# Patient Record
Sex: FEMALE | Race: WHITE | Employment: FULL TIME | URBAN - METROPOLITAN AREA
[De-identification: names, ages, dates, MRNs, and addresses within clinical notes are randomized per-mention and may not be internally consistent; named-entity substitution may affect disease eponyms.]

---

## 2017-02-03 ENCOUNTER — TELEPHONE (OUTPATIENT)
Dept: FAMILY MEDICINE CLINIC | Facility: CLINIC | Age: 32
End: 2017-02-03

## 2017-02-03 RX ORDER — CALCIUM CITRATE/VITAMIN D3 200MG-6.25
1 TABLET ORAL DAILY
COMMUNITY
Start: 2016-02-16

## 2017-02-03 RX ORDER — CLONAZEPAM 0.5 MG/1
TABLET ORAL
COMMUNITY
Start: 2016-02-17 | End: 2017-03-10

## 2017-03-11 RX ORDER — CLONAZEPAM 0.5 MG/1
TABLET ORAL
Qty: 30 TABLET | Refills: 5 | Status: SHIPPED
Start: 2017-03-11 | End: 2017-03-14

## 2017-03-14 RX ORDER — CLONAZEPAM 0.5 MG/1
TABLET ORAL
Qty: 30 TABLET | Refills: 5 | Status: SHIPPED
Start: 2017-03-14 | End: 2017-09-11

## 2017-03-15 ENCOUNTER — TELEPHONE (OUTPATIENT)
Dept: FAMILY MEDICINE CLINIC | Facility: CLINIC | Age: 32
End: 2017-03-15

## 2017-03-15 NOTE — TELEPHONE ENCOUNTER
Patient informed of Dr Haley Iniguez documentation below.   Expressed understanding,  Angelica Armendariz, 03/15/2017, 6:40 PM

## 2017-03-15 NOTE — TELEPHONE ENCOUNTER
Patient asking if ok to use tanning murdock. Has heard it could help with depression. Please advise.   Syeda Conklin, 03/15/2017, 3:57 PM

## 2017-04-14 ENCOUNTER — TELEPHONE (OUTPATIENT)
Dept: FAMILY MEDICINE CLINIC | Facility: CLINIC | Age: 32
End: 2017-04-14

## 2017-04-14 NOTE — TELEPHONE ENCOUNTER
Patient states she is on sertraline. Has been on the medication for 9 months. States \"winter has been hard\" and wants to increase dose on sertraline. States she needs more of a push. Patient has upcoming appt this month with Dr. Ilana Loza.  Has been watchi

## 2017-04-17 NOTE — TELEPHONE ENCOUNTER
Patient called again today about the following below. Informed patient that Dr Shante Seo is out of the office until Tuesday 4/18 and patient is ok to wait until then.    Future Appointments  Date Time Provider Chen Rebollar   4/24/2017 10:00 AM Shante Seo,

## 2017-04-18 NOTE — TELEPHONE ENCOUNTER
Patient informed of Dr Slava Scott instructions below. Expressed understanding.   Gerard Thurston, 04/18/2017, 11:06 AM

## 2017-04-24 PROBLEM — R53.83 FATIGUE: Status: ACTIVE | Noted: 2017-04-24

## 2017-04-24 NOTE — PROGRESS NOTES
[address]  PROGRESS NOTE  Chief Complaint:   Patient presents with: Anxiety  Follow - Up      HPI:   This is a 27year old female coming in for follow-up on her anxiety and depression.   She has been taking clonazepam in the evening and Denies eye pain, visual loss, blurred vision, double vision or yellow sclerae. Ears, Nose, Throat:  Denies hearing loss, sneezing, congestion, runny nose or sore throat. INTEGUMENTARY:  Denies rashes, itching, skin lesion, or excessive skin dryness.   CARD rate and rhythm, no murmurs, rubs or gallops. LUNGS: Clear to auscultation bilterally, no rales/rhonchi/wheezing. ASSESSMENT AND PLAN:   1.  Moderate episode of recurrent major depressive disorder ([facility] [address]  Her depression is relatively well-controlled with

## 2017-07-07 ENCOUNTER — TELEPHONE (OUTPATIENT)
Dept: FAMILY MEDICINE CLINIC | Facility: CLINIC | Age: 32
End: 2017-07-07

## 2017-07-07 NOTE — TELEPHONE ENCOUNTER
Patient going on trip to [facility] end of July. States She will be arriving in [facility] [place] in AM of their time. Time change is 7-8hrs difference.     Will take Clonazepam on flight over for night time dose and then  Start Sertraline in AM when She gets there for

## 2017-09-11 ENCOUNTER — TELEPHONE (OUTPATIENT)
Dept: FAMILY MEDICINE CLINIC | Facility: CLINIC | Age: 32
End: 2017-09-11

## 2017-09-11 RX ORDER — CLONAZEPAM 0.5 MG/1
TABLET ORAL
Qty: 30 TABLET | Refills: 1 | Status: SHIPPED
Start: 2017-09-11 | End: 2017-10-09

## 2017-09-11 RX ORDER — CLONAZEPAM 0.5 MG/1
TABLET ORAL
Qty: 30 TABLET | Status: CANCELLED | OUTPATIENT
Start: 2017-09-11

## 2017-09-12 RX ORDER — CLONAZEPAM 0.5 MG/1
TABLET ORAL
Qty: 30 TABLET
Start: 2017-09-12

## 2017-09-12 NOTE — TELEPHONE ENCOUNTER
Please call - this was refilled yesterday. She may have it refilled but only needs one prescription.

## 2017-09-12 NOTE — TELEPHONE ENCOUNTER
Future appt:     Your appointments     Date & Time Appointment Department [facility])    Oct 24, 2017 10:00 AM CDT Follow up - Extended with Marino Saenz MD [address] [place], [place] [address]

## 2017-10-09 ENCOUNTER — OFFICE VISIT (OUTPATIENT)
Dept: FAMILY MEDICINE CLINIC | Facility: CLINIC | Age: 32
End: 2017-10-09

## 2017-10-09 VITALS
HEIGHT: 64 IN | RESPIRATION RATE: 14 BRPM | DIASTOLIC BLOOD PRESSURE: 74 MMHG | BODY MASS INDEX: 22.11 KG/M2 | SYSTOLIC BLOOD PRESSURE: 120 MMHG | HEART RATE: 68 BPM | WEIGHT: 129.5 LBS | TEMPERATURE: 98 F

## 2017-10-09 DIAGNOSIS — T63.461A WASP STING, ACCIDENTAL OR UNINTENTIONAL, INITIAL ENCOUNTER: Primary | ICD-10-CM

## 2017-10-09 PROCEDURE — 99213 OFFICE O/P EST LOW 20 MIN: CPT | Performed by: FAMILY MEDICINE

## 2017-10-09 RX ORDER — CEPHALEXIN 500 MG/1
500 CAPSULE ORAL 3 TIMES DAILY
Qty: 30 CAPSULE | Refills: 0 | Status: SHIPPED | OUTPATIENT
Start: 2017-10-09 | End: 2018-05-01 | Stop reason: ALTCHOICE

## 2017-10-09 RX ORDER — CLONAZEPAM 0.5 MG/1
TABLET ORAL
Qty: 30 TABLET | Refills: 3 | Status: SHIPPED
Start: 2017-10-09 | End: 2018-05-01

## 2017-10-09 NOTE — PROGRESS NOTES
[address]  PROGRESS NOTE  Chief Complaint:   Patient presents with:  Bite Sting,Insect (integumentary): Red, swollen, hot to touch. HPI:   This is a 34year old female coming in for she said she was visiting a friend on October 6. Answered       REVIEW OF SYSTEMS:   CONSTITUTIONAL:  Denies unusual weight gain/loss, fever, chills, or fatigue. EENT:  Eyes:  Denies eye pain, visual loss, blurred vision, double vision or yellow sclerae.  Ears, Nose, Throat:  Denies hearing loss, sneezin AND PLAN:   1. Wasp sting, accidental or unintentional, initial encounter  Wasp or hornet sting on the inner aspect of the left arm. In addition to the reaction in the setting she also is developing cellulitis.   Plan: Keflex 500 mg 3 times daily for 10 da

## 2017-10-09 NOTE — PATIENT INSTRUCTIONS
Take Cephalexin three times a day for 10 days. Take over the counter Zyrtec as needed for allergy symptoms.

## 2017-10-17 NOTE — PROGRESS NOTES
[address]  PROGRESS NOTE  Chief Complaint:   Patient presents with: Follow - Up: 6 month check      HPI:   This is a 34year old female coming in for follow-up on her anxiety and depression.     She is feeling much much better than she Ears, Nose, Throat:  Denies hearing loss, sneezing, congestion, runny nose or sore throat. INTEGUMENTARY:  Denies rashes, itching, skin lesion, or excessive skin dryness.   CARDIOVASCULAR:  Denies chest pain, chest pressure, chest discomfort, palpitations, CLAD, no JVD, no thyromegaly. SKIN: No rashes, no skin lesion, no bruising, good turgor. HEART:  Regular rate and rhythm, no murmurs, rubs or gallops. LUNGS: Clear to auscultation bilterally, no rales/rhonchi/wheezing. ASSESSMENT AND PLAN:   1.  Anx

## 2017-10-17 NOTE — PATIENT INSTRUCTIONS
Work on weaning the medications. Clonazepam:  Take 1/2 tablet daily for 1 week, then stop. Sertraline:  Wait one month, then try 1 tablet every other day for 2-4 weeks. If no problems, stop taking it. Call and give us a progress report.

## 2017-10-30 ENCOUNTER — TELEPHONE (OUTPATIENT)
Dept: FAMILY MEDICINE CLINIC | Facility: CLINIC | Age: 32
End: 2017-10-30

## 2017-10-30 NOTE — TELEPHONE ENCOUNTER
Recommend to give another 3 - 5 days off the medication as it can take some time for the medication to be fully out of the system. If symptoms worsen, needs appointment. Call if not improving at that time.  Luann Magallanes, 10/30/17, 1:28 PM

## 2017-10-30 NOTE — TELEPHONE ENCOUNTER
Patient states that she has been going off Clonazepam. And per Dr Shante Seo she was suppose to go off of the medication like this:  Take 1/2 tablet daily for 1 week, then stop. Call and give us a progress report.         Patient states that it will be 1 w

## 2017-11-13 ENCOUNTER — TELEPHONE (OUTPATIENT)
Dept: FAMILY MEDICINE CLINIC | Facility: CLINIC | Age: 32
End: 2017-11-13

## 2017-11-13 NOTE — TELEPHONE ENCOUNTER
Patient states She has been of the Clonazepam x3 weeks. States Her Sx on not taking Clonazepam have been more physical than emotional.  States She has woken up a few nights with Her jaw clenched. Feels a little scrambled mentally. But overall ok.     Wou

## 2017-11-13 NOTE — TELEPHONE ENCOUNTER
This is great news. Please continue to stay off the clonazepam.    I agree with remaining on the sertraline. She can try to wean it anytime she chooses. He would be fine to take it through the entire winter.   Please call if any additional problems come

## 2018-03-05 NOTE — TELEPHONE ENCOUNTER
Future appt:    Last Appointment:  10/17/2017  No results found for: CHOLEST, HDL, LDL, TRIGLY, TRIG  No results found for: EAG, A1C  No results found for: T4F, TSH, TSHT4    No Follow-up on file.

## 2018-03-09 ENCOUNTER — TELEPHONE (OUTPATIENT)
Dept: FAMILY MEDICINE CLINIC | Facility: CLINIC | Age: 33
End: 2018-03-09

## 2018-03-09 NOTE — TELEPHONE ENCOUNTER
Patient states She has been clenching Her jaw during sleep for the past 2 months. Has had some jaw pain during the day. States She does not know why She would be doing this, but it is becoming bothersome. Asking if Her Medications should be changed?

## 2018-03-09 NOTE — TELEPHONE ENCOUNTER
----- Message from Aracelis Condon sent at 3/9/2018  3:01 PM CST -----  [facility]  [id number] [facility] [place], 03/09/18, 3:02 PM

## 2018-03-09 NOTE — TELEPHONE ENCOUNTER
Patient informed of below. Expressed understanding. She will try mouth guard first.    Will call back to schedule appt if She feels has not helped.   Sudha Hernandez, 03/09/18, 3:56 PM

## 2018-03-27 ENCOUNTER — TELEPHONE (OUTPATIENT)
Dept: FAMILY MEDICINE CLINIC | Facility: CLINIC | Age: 33
End: 2018-03-27

## 2018-03-27 NOTE — TELEPHONE ENCOUNTER
Patient states Her depression sx are coming back. Not sure what to do regarding Her Medication. Informed Dr Debra Bucio out of the office this week and nothing available for awhile. Patient given appt with Ara Mejia 4/3.   David Leal, 03/27/18, 12:5

## 2018-05-01 NOTE — PROGRESS NOTES
[facility] [place]  PROGRESS NOTE  Chief Complaint:   Patient presents with:  Jaw Pain: Clenching jaw - causing  some pain      HPI:   This is a 34year old female coming in for follow-up on her medications.   She said that a month ago she was fe Occas[employer] Wine    Family History:  Family History   Problem Relation Age of Onset   • Other[other] [OTHER] Mother      Allergies:    Amoxicillin                 Comment:Other reaction(s): rash  Sulfamethoxazole W/*        Comment:Other reaction(s): rash- 72   Temp (!) 97 °F (36.1 °C) (Tympanic)   Resp 16   Ht 63\"   Wt 137 lb 2 oz   BMI 24.29 kg/m²  Estimated body mass index is 24.29 kg/m² as calculated from the following:    Height as of this encounter: 63\". Weight as of this encounter: 137 lb 2 oz. prescriptions requested or ordered in this encounter      Patient/Caregiver Education: Patient/Caregiver Education: There are no barriers to learning. Medical education done. Outcome: Patient verbalizes understanding.  Patient is notified to call with any

## 2018-05-07 ENCOUNTER — TELEPHONE (OUTPATIENT)
Dept: FAMILY MEDICINE CLINIC | Facility: CLINIC | Age: 33
End: 2018-05-07

## 2018-05-07 NOTE — TELEPHONE ENCOUNTER
Patient states She did some research on Sertraline. States side effects She read Sertraline can cause Jaw Clenching/ Feeling Unmotivated and this  This is what She is feeling. Not feeling bad, but not feeling good.     States the day She saw Dr Vince espino

## 2018-05-07 NOTE — TELEPHONE ENCOUNTER
Please decrease the sertraline to 25 mg once a day for 1 week, then stop it (cut the tablets in half and take half a tablet daily). Call with a progress report in 2 weeks.

## 2018-06-05 ENCOUNTER — TELEPHONE (OUTPATIENT)
Dept: FAMILY MEDICINE CLINIC | Facility: CLINIC | Age: 33
End: 2018-06-05

## 2018-06-05 NOTE — TELEPHONE ENCOUNTER
Good news. Please stay off the sertraline. I am glad that she was able to successfully wean it off. No additional testing or medications needed.

## 2018-06-05 NOTE — TELEPHONE ENCOUNTER
Patient states She has been off her medications x3 weeks. Does not feel any different that when she was on the medications. Still clenching jaw. Did get nightguard. Has some headaches but, feels it is due to her jaw.     Patient got engaged and will be

## 2018-07-18 ENCOUNTER — TELEPHONE (OUTPATIENT)
Dept: FAMILY MEDICINE CLINIC | Facility: CLINIC | Age: 33
End: 2018-07-18

## 2018-07-18 RX ORDER — CLONAZEPAM 0.5 MG/1
0.5 TABLET ORAL 2 TIMES DAILY PRN
Qty: 30 TABLET | Refills: 1 | Status: SHIPPED
Start: 2018-07-18 | End: 2019-06-21

## 2018-07-18 NOTE — TELEPHONE ENCOUNTER
Patient informed of below. Expressed understanding. Call given to Appt desk for Appt with Manisha Hash.   Yolandase Larios, 07/18/18, 3:55 PM

## 2018-07-18 NOTE — TELEPHONE ENCOUNTER
Patient states She is off all Her Medications. States She is having increased anxiety/anxious thoughts,worries,insecurities. No thoughts of self harm. States She did recently see Zarina Drummond.  States it did help Her. She is willing to see Armand Craven again.     A

## 2018-07-18 NOTE — TELEPHONE ENCOUNTER
Please make an appointment to get him with Shea Gardiner and do counseling as soon as possible. In addition, it would be okay to take clonazepam infrequently as needed. I will send in a new prescription.

## 2018-08-23 ENCOUNTER — OFFICE VISIT (OUTPATIENT)
Dept: FAMILY MEDICINE CLINIC | Facility: CLINIC | Age: 33
End: 2018-08-23

## 2018-08-23 VITALS
BODY MASS INDEX: 21.89 KG/M2 | WEIGHT: 133 LBS | RESPIRATION RATE: 16 BRPM | TEMPERATURE: 98 F | HEIGHT: 65.25 IN | SYSTOLIC BLOOD PRESSURE: 104 MMHG | HEART RATE: 60 BPM | DIASTOLIC BLOOD PRESSURE: 68 MMHG
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
## 2018-08-23 PROCEDURE — 99214 OFFICE O/P EST MOD 30 MIN: CPT | Performed by: FAMILY MEDICINE

## 2018-08-23 RX ORDER — DOXYCYCLINE HYCLATE 100 MG
100 TABLET ORAL 2 TIMES DAILY
Qty: 20 TABLET | Refills: 0 | Status: SHIPPED | OUTPATIENT
Start: 2018-08-23 | End: 2018-09-02

## 2018-08-23 NOTE — PATIENT INSTRUCTIONS
Likely insect bite. Start doxycycline. Apply topical neosporin daily. Return to clinic if any increase redness, pain, warmth, fever or oozing.

## 2018-08-23 NOTE — PROGRESS NOTES
[facility] [place]  PROGRESS NOTE  Chief Complaint:   Patient presents with:  Bite Sting,Insect (integumentary)      HPI:   This is a 34year old female presents to clinic complaining of possible insect bite on her left buttock.   Has noticed marta throat. INTEGUMENTARY: See HPI  CARDIOVASCULAR:  Denies chest pain, chest pressure, chest discomfort, palpitations, edema, dyspnea on exertion or at rest.  RESPIRATORY:  Denies shortness of breath, wheezing, cough or sputum.   GASTROINTESTINAL:  Denies abd normal gait, strength and tone, sensory intact, normal reflexes. PSYCHIATRIC: Alert and oriented x 3; affect appropriate, no depressed mood or anxiety      ASSESSMENT AND PLAN:   Carmen Valero was seen today for bite sting,insect (integumentary).     Diagnoses and

## 2018-08-27 ENCOUNTER — APPOINTMENT (OUTPATIENT)
Dept: LAB | Age: 33
End: 2018-08-27
Attending: NURSE PRACTITIONER
Payer: COMMERCIAL

## 2018-08-27 ENCOUNTER — OFFICE VISIT (OUTPATIENT)
Dept: FAMILY MEDICINE CLINIC | Facility: CLINIC | Age: 33
End: 2018-08-27
Payer: COMMERCIAL

## 2018-08-27 VITALS
HEIGHT: 63 IN | SYSTOLIC BLOOD PRESSURE: 110 MMHG | WEIGHT: 134 LBS | TEMPERATURE: 98 F | DIASTOLIC BLOOD PRESSURE: 70 MMHG | HEART RATE: 72 BPM | BODY MASS INDEX: 23.74 KG/M2

## 2018-08-27 DIAGNOSIS — Z00.00 ENCOUNTER FOR HEALTH MAINTENANCE EXAMINATION IN ADULT: Primary | ICD-10-CM

## 2018-08-27 DIAGNOSIS — Z01.419 ENCOUNTER FOR GYNECOLOGICAL EXAMINATION: ICD-10-CM

## 2018-08-27 LAB
CHOLEST SMN-MCNC: 178 MG/DL (ref ?–200)
HDLC SERPL-MCNC: 51 MG/DL (ref 40–59)
LDLC SERPL CALC-MCNC: 116 MG/DL (ref ?–100)
NONHDLC SERPL-MCNC: 127 MG/DL (ref ?–130)
TRIGL SERPL-MCNC: 55 MG/DL (ref 30–149)
VLDLC SERPL CALC-MCNC: 11 MG/DL (ref 0–30)

## 2018-08-27 PROCEDURE — 99395 PREV VISIT EST AGE 18-39: CPT | Performed by: NURSE PRACTITIONER

## 2018-08-27 PROCEDURE — 88175 CYTOPATH C/V AUTO FLUID REDO: CPT | Performed by: NURSE PRACTITIONER

## 2018-08-27 PROCEDURE — 90715 TDAP VACCINE 7 YRS/> IM: CPT | Performed by: NURSE PRACTITIONER

## 2018-08-27 PROCEDURE — 90471 IMMUNIZATION ADMIN: CPT | Performed by: NURSE PRACTITIONER

## 2018-08-27 PROCEDURE — 80061 LIPID PANEL: CPT | Performed by: NURSE PRACTITIONER

## 2018-08-27 PROCEDURE — 36415 COLL VENOUS BLD VENIPUNCTURE: CPT | Performed by: NURSE PRACTITIONER

## 2018-08-27 NOTE — PATIENT INSTRUCTIONS
Pap smear obtained today–results were back within a week.     Tetanus shot today (tetanus, diphtheria, pertussis)–she will be due again in 10 years    We will check cholesterol blood work today (if triglycerides are elevated–it may be due to the orange juic

## 2018-08-27 NOTE — PROGRESS NOTES
HPI:    Patient ID: Theo Ly is a 34year old female. HPI  Patient is here for a physical and pap. Patient states she has never had a Pap smear. Patient has never been sexually active.   She denies any vaginal complaints–states she will b person, place, and time. Vital signs are normal. She appears well-developed and well-nourished. No distress. HENT:   Head: Normocephalic and atraumatic.    Right Ear: Hearing, tympanic membrane, external ear and ear canal normal.   Left Ear: Hearing, tymp no tenderness and no fullness. No vaginal discharge found. Genitourinary Comments: Hymen intact   Musculoskeletal: Normal range of motion. Moves all 4 extremities, normal strength   Lymphadenopathy:     She has no cervical adenopathy.      She has no ax tubs or saunas, no stacie diving or scuba diving. Mild cramping is normal with round ligament stretching, but you should seek care if  severe cramping and/or bleeding.        Call OB/GYN office to schedule an appointment for pregnancy care. - Dr. Clarita Oates,,

## 2018-08-29 ENCOUNTER — TELEPHONE (OUTPATIENT)
Dept: FAMILY MEDICINE CLINIC | Facility: CLINIC | Age: 33
End: 2018-08-29

## 2018-08-29 NOTE — TELEPHONE ENCOUNTER
----- Message from KEVIN Hayden sent at 8/29/2018 12:15 PM CDT -----  Please notify patient that her Pap smear is within normal limits. Recommend annual physical, will discuss need for Pap at physical next year. Thank you.

## 2018-09-04 ENCOUNTER — TELEPHONE (OUTPATIENT)
Dept: FAMILY MEDICINE CLINIC | Facility: CLINIC | Age: 33
End: 2018-09-04

## 2018-09-04 NOTE — TELEPHONE ENCOUNTER
Patient states She is getting  in 3 weeks. Increased Anxiety/Depression Sx. Needs to discuss birth control. Appt given Wed 9/5 5pm with Dr Vivian Vallejo.   Caroline Segovia, 09/04/18, 9:55 AM

## 2018-09-05 PROBLEM — T63.461A WASP STING: Status: RESOLVED | Noted: 2017-10-09 | Resolved: 2018-09-05

## 2018-09-05 NOTE — PROGRESS NOTES
[address]  PROGRESS NOTE  Chief Complaint:   Patient presents with: Anxiety: Depression  Contraception: Discuss BCP      HPI:   This is a 34year old female coming in for anxiety and depression.   She would also like to discuss contrace [facility] REMOVED]    Clinical Information Z01.419 Encounter For Gynecological Examination.    [FORMATTING REMOVED]    Reason for testing Screening    Gyn Additional Information NOTE:  The Pap smear is a screening test that aids in the detection of   cerv 30 tablet Rfl: 1   Multiple Vitamins-Minerals (MULTIVITAMIN GUMMIES WOMENS) Oral Chew Tab Chew 1 tablet by mouth daily. Disp:  Rfl:       Counseling given: Not Answered       REVIEW OF SYSTEMS:   CONSTITUTIONAL: She still has significant anxiety.   EENT: atraumatic Eyes: EOMI, PERRLA, no scleral icterus, conjunctivae clear bilaterally, no eye discharge Ears: External normal. Nose: patent, no nasal discharge Throat:  No tonsillar erythema or exudate. Mouth:  No oral lesions or ulcerations, good dentition. but I recommended condoms as the choice with the fewest long-term consequences.       Meds & Refills for this Visit:  No prescriptions requested or ordered in this encounter    Health Maintenance:  Influenza Vaccine(1) due on 09/01/2018    Patient/Caregiver

## 2018-10-15 ENCOUNTER — TELEPHONE (OUTPATIENT)
Dept: FAMILY MEDICINE CLINIC | Facility: CLINIC | Age: 33
End: 2018-10-15

## 2018-10-15 NOTE — TELEPHONE ENCOUNTER
Per Mom-  Patient asked Mom to call regarding TMJ. Patient has returned from [place] in [place]. States Patient had times of extreme Jaw/Nerve pain. States it is starting to affect her bottom teeth.     Asking if there is someone specific patient ca

## 2018-10-22 ENCOUNTER — OFFICE VISIT (OUTPATIENT)
Dept: FAMILY MEDICINE CLINIC | Facility: CLINIC | Age: 33
End: 2018-10-22
Payer: COMMERCIAL

## 2018-10-22 VITALS
HEART RATE: 114 BPM | RESPIRATION RATE: 18 BRPM | OXYGEN SATURATION: 99 % | BODY MASS INDEX: 23.53 KG/M2 | WEIGHT: 132.81 LBS | TEMPERATURE: 100 F | SYSTOLIC BLOOD PRESSURE: 100 MMHG | HEIGHT: 63 IN | DIASTOLIC BLOOD PRESSURE: 68 MMHG

## 2018-10-22 DIAGNOSIS — R10.30 LOWER ABDOMINAL PAIN: Primary | ICD-10-CM

## 2018-10-22 DIAGNOSIS — J01.00 ACUTE NON-RECURRENT MAXILLARY SINUSITIS: ICD-10-CM

## 2018-10-22 PROCEDURE — 81003 URINALYSIS AUTO W/O SCOPE: CPT | Performed by: FAMILY MEDICINE

## 2018-10-22 PROCEDURE — 99213 OFFICE O/P EST LOW 20 MIN: CPT | Performed by: FAMILY MEDICINE

## 2018-10-22 RX ORDER — CEPHALEXIN 500 MG/1
500 CAPSULE ORAL 4 TIMES DAILY
Qty: 40 CAPSULE | Refills: 0 | Status: SHIPPED | OUTPATIENT
Start: 2018-10-22 | End: 2018-12-26

## 2018-10-22 NOTE — PROGRESS NOTES
[facility] [place]  PROGRESS NOTE  Chief Complaint:   Patient presents with:  Fever: fever, dry nose, nose bleeds  Abdominal Pain: lower abdominal pain      HPI:   This is a 28year old female coming in for multiple concerns.   She has had a feve Onset   • Hypertension Mother    • Lipids Mother    • Depression Mother          anxiety     Allergies:    Amoxicillin                 Comment:Other reaction(s): rash  Sulfamethoxazole W/*        Comment:Other reaction(s): rash- went to ER (out of town)  C (37.7 °C) (Temporal)   Resp 18   Ht 63\"   Wt 132 lb 12.8 oz   LMP 10/10/2018   SpO2 99%   BMI 23.52 kg/m²  Estimated body mass index is 23.52 kg/m² as calculated from the following:    Height as of this encounter: 63\".     Weight as of this encounter: 132 Vaccine(1) due on 09/01/2018    Patient/Caregiver Education: Patient/Caregiver Education: There are no barriers to learning. Medical education done. Outcome: Patient verbalizes understanding.  Patient is notified to call with any questions, complications,

## 2018-12-26 ENCOUNTER — APPOINTMENT (OUTPATIENT)
Dept: LAB | Age: 33
End: 2018-12-26
Attending: NURSE PRACTITIONER
Payer: COMMERCIAL

## 2018-12-26 ENCOUNTER — OFFICE VISIT (OUTPATIENT)
Dept: FAMILY MEDICINE CLINIC | Facility: CLINIC | Age: 33
End: 2018-12-26
Payer: COMMERCIAL

## 2018-12-26 VITALS
OXYGEN SATURATION: 98 % | DIASTOLIC BLOOD PRESSURE: 72 MMHG | WEIGHT: 134 LBS | BODY MASS INDEX: 22.88 KG/M2 | RESPIRATION RATE: 16 BRPM | HEART RATE: 72 BPM | HEIGHT: 64 IN | TEMPERATURE: 98 F | SYSTOLIC BLOOD PRESSURE: 100 MMHG

## 2018-12-26 DIAGNOSIS — N89.8 VAGINAL DISCHARGE: Primary | ICD-10-CM

## 2018-12-26 DIAGNOSIS — Z00.00 ENCOUNTER FOR WELLNESS EXAMINATION: ICD-10-CM

## 2018-12-26 PROCEDURE — 36415 COLL VENOUS BLD VENIPUNCTURE: CPT | Performed by: NURSE PRACTITIONER

## 2018-12-26 PROCEDURE — 99214 OFFICE O/P EST MOD 30 MIN: CPT | Performed by: NURSE PRACTITIONER

## 2018-12-26 PROCEDURE — 80053 COMPREHEN METABOLIC PANEL: CPT | Performed by: NURSE PRACTITIONER

## 2018-12-26 PROCEDURE — 85025 COMPLETE CBC W/AUTO DIFF WBC: CPT | Performed by: NURSE PRACTITIONER

## 2018-12-26 PROCEDURE — 87510 GARDNER VAG DNA DIR PROBE: CPT | Performed by: NURSE PRACTITIONER

## 2018-12-26 PROCEDURE — 87660 TRICHOMONAS VAGIN DIR PROBE: CPT | Performed by: NURSE PRACTITIONER

## 2018-12-26 PROCEDURE — 87480 CANDIDA DNA DIR PROBE: CPT | Performed by: NURSE PRACTITIONER

## 2018-12-26 NOTE — PATIENT INSTRUCTIONS
Continue with vitamins and folic acid. Labs pending. Bring in form to be completed. Recheck based on results. Physical due in August, 2019.

## 2018-12-27 ENCOUNTER — TELEPHONE (OUTPATIENT)
Dept: FAMILY MEDICINE CLINIC | Facility: CLINIC | Age: 33
End: 2018-12-27

## 2018-12-27 NOTE — TELEPHONE ENCOUNTER
Patient in the waiting room requesting lab results and also brought the form with to be filled out. Please advise.

## 2018-12-31 ENCOUNTER — APPOINTMENT (OUTPATIENT)
Dept: LAB | Age: 33
End: 2018-12-31
Attending: NURSE PRACTITIONER
Payer: COMMERCIAL

## 2018-12-31 ENCOUNTER — OFFICE VISIT (OUTPATIENT)
Dept: FAMILY MEDICINE CLINIC | Facility: CLINIC | Age: 33
End: 2018-12-31
Payer: COMMERCIAL

## 2018-12-31 VITALS
SYSTOLIC BLOOD PRESSURE: 104 MMHG | BODY MASS INDEX: 22.88 KG/M2 | WEIGHT: 134 LBS | HEIGHT: 64 IN | HEART RATE: 80 BPM | RESPIRATION RATE: 16 BRPM | DIASTOLIC BLOOD PRESSURE: 62 MMHG | TEMPERATURE: 98 F

## 2018-12-31 DIAGNOSIS — Z3A.01 LESS THAN 8 WEEKS GESTATION OF PREGNANCY: Primary | ICD-10-CM

## 2018-12-31 PROCEDURE — 90686 IIV4 VACC NO PRSV 0.5 ML IM: CPT | Performed by: NURSE PRACTITIONER

## 2018-12-31 PROCEDURE — 84702 CHORIONIC GONADOTROPIN TEST: CPT | Performed by: NURSE PRACTITIONER

## 2018-12-31 PROCEDURE — 90471 IMMUNIZATION ADMIN: CPT | Performed by: NURSE PRACTITIONER

## 2018-12-31 PROCEDURE — 36415 COLL VENOUS BLD VENIPUNCTURE: CPT | Performed by: NURSE PRACTITIONER

## 2018-12-31 PROCEDURE — 99214 OFFICE O/P EST MOD 30 MIN: CPT | Performed by: NURSE PRACTITIONER

## 2018-12-31 NOTE — PROGRESS NOTES
Connie Maher is a 28year old female. Patient presents with:  No Period/no Cycle: missed menses  Pregnancy: positive test at home      HPI:   Complaints of pregnancy-    LMP around Thanksgiving.    Was due for her menses around 12/28-    Took pregnancy t BMI 23.00 kg/m²   GENERAL: well developed, well nourished,in no apparent distress  SKIN: no rashes,no suspicious lesions  HEENT: atraumatic, normocephalic,ears and throat are clear  NECK: supple,no adenopathy, normal thyroid, no nodules  LUNGS: normal rate

## 2019-01-01 DIAGNOSIS — Z3A.01 LESS THAN 8 WEEKS GESTATION OF PREGNANCY: Primary | ICD-10-CM

## 2019-01-02 ENCOUNTER — TELEPHONE (OUTPATIENT)
Dept: FAMILY MEDICINE CLINIC | Facility: CLINIC | Age: 34
End: 2019-01-02

## 2019-01-02 NOTE — TELEPHONE ENCOUNTER
Pt did get the information and has already schedule blood work.     Future Appointments   Date Time Provider Chen Rebollar   1/3/2019  3:45 PM REF [place] REF [facility]

## 2019-01-02 NOTE — TELEPHONE ENCOUNTER
----- Message from HILDA Jenkins sent at 1/1/2019  1:53 PM CST -----  My chart message sent- please call patient to be sure she got the message-   Your pregnancy test is positive, but it looks like you are a little earlier than we anticipated.   The

## 2019-01-03 ENCOUNTER — APPOINTMENT (OUTPATIENT)
Dept: LAB | Age: 34
End: 2019-01-03
Attending: FAMILY MEDICINE
Payer: COMMERCIAL

## 2019-01-03 DIAGNOSIS — Z3A.01 LESS THAN 8 WEEKS GESTATION OF PREGNANCY: ICD-10-CM

## 2019-01-03 LAB — B-HCG SERPL-ACNC: 2046 MIU/ML

## 2019-01-03 PROCEDURE — 36415 COLL VENOUS BLD VENIPUNCTURE: CPT | Performed by: NURSE PRACTITIONER

## 2019-01-03 PROCEDURE — 84702 CHORIONIC GONADOTROPIN TEST: CPT | Performed by: NURSE PRACTITIONER

## 2019-04-15 NOTE — PROGRESS NOTES
Olga Fam is a 28year old female.   Patient presents with:  Medication Request: Wants to go back on anxiety/depression med      HPI:   Complaints of had been on and off medications- longest is 1 year- Lexapro and clonazepam together-    For the past 6 anxiety        Allergies    Amoxicillin                 Comment:Other reaction(s): rash  Sulfamethoxazole W/*        Comment:Other reaction(s): rash- went to ER (out of town)    REVIEW OF SYSTEMS:   GENERAL HEALTH: feels well otherwise  HEENT: den

## 2019-04-24 ENCOUNTER — PATIENT MESSAGE (OUTPATIENT)
Dept: FAMILY MEDICINE CLINIC | Facility: CLINIC | Age: 34
End: 2019-04-24

## 2019-04-24 NOTE — TELEPHONE ENCOUNTER
----- Message from Connie Maher sent at 4/24/2019  3:18 PM CDT -----  Regarding: Prescription Question  Contact: [phone]  Hi there! I started sertraline a week and a half ago. Started w half pulls for a week, then since Monday doing full pill.

## 2019-06-19 ENCOUNTER — TELEPHONE (OUTPATIENT)
Dept: FAMILY MEDICINE CLINIC | Facility: CLINIC | Age: 34
End: 2019-06-19

## 2019-06-19 NOTE — TELEPHONE ENCOUNTER
Patient states she had a miscarriage this spring. Has been having difficulty with anxiety/depression. Restarted Sertraline. Patient would like to see  to discuss sx and medications. Appt given  Friday 6/21 1:30. Future appt:     Your

## 2019-06-19 NOTE — TELEPHONE ENCOUNTER
has appt 7/3   for ongoing depression           would like to discuss this with you      please call

## 2019-06-21 PROBLEM — J01.00 ACUTE NON-RECURRENT MAXILLARY SINUSITIS: Status: RESOLVED | Noted: 2018-10-22 | Resolved: 2019-06-21

## 2019-06-21 NOTE — PROGRESS NOTES
[facility] [place]  PROGRESS NOTE  Chief Complaint:   Patient presents with:  Medication Follow-Up: Anxiety/depression      HPI:   This is a 28year old female coming in for follow-up on her depression.     She said she is not happy with the with town)  Current Meds:    Current Outpatient Medications:  Citalopram Hydrobromide 10 MG Oral Tab Take 1 tablet (10 mg total) by mouth daily.  Disp: 90 tablet Rfl: 1   Multiple Vitamins-Minerals (MULTIVITAMIN GUMMIES WOMENS) Oral Chew Tab Chew 1 tablet by elizabeth reviewed. Appears stated age, well groomed. Physical Exam:  GEN:  Patient is alert, awake and oriented, well developed, well nourished, no apparent distress.   HEENT:  Head:  Normocephalic, atraumatic Eyes: EOMI, PERRLA, no scleral icterus, conjunctivae c List:     Allergic state     Depression     Anxiety state     Fatigue     Lower abdominal pain     Acute non-recurrent maxillary sinusitis      Wesley Loza MD  6/21/2019  1:58 PM

## 2019-07-27 NOTE — PROGRESS NOTES
[facility] [place]  PROGRESS NOTE  Chief Complaint:   Patient presents with:  Medication Follow-Up      HPI:   This is a 28year old female coming in for follow-up on her medication. She said that the citalopram has been very helpful.   She said Eyes:  Denies eye pain, visual loss, blurred vision, double vision or yellow sclerae. Ears, Nose, Throat:  Denies hearing loss, sneezing, congestion, runny nose or sore throat.   INTEGUMENTARY:  Denies rashes, itching, skin lesion, or excessive skin dryness murmurs, rubs or gallops. LUNGS: Clear to auscultation bilterally, no rales/rhonchi/wheezing. ASSESSMENT AND PLAN:   1.  Moderate episode of recurrent major depressive disorder (HCC)  Her depression is much improved on citalopram.  Plan: Please contin

## 2019-10-15 ENCOUNTER — OFFICE VISIT (OUTPATIENT)
Dept: FAMILY MEDICINE CLINIC | Facility: CLINIC | Age: 34
End: 2019-10-15
Payer: COMMERCIAL

## 2019-10-15 VITALS
OXYGEN SATURATION: 98 % | SYSTOLIC BLOOD PRESSURE: 120 MMHG | BODY MASS INDEX: 25.78 KG/M2 | HEART RATE: 82 BPM | HEIGHT: 64 IN | WEIGHT: 151 LBS | RESPIRATION RATE: 16 BRPM | DIASTOLIC BLOOD PRESSURE: 74 MMHG | TEMPERATURE: 98 F

## 2019-10-15 DIAGNOSIS — N92.1 MENORRHAGIA WITH IRREGULAR CYCLE: Primary | ICD-10-CM

## 2019-10-15 PROCEDURE — 99214 OFFICE O/P EST MOD 30 MIN: CPT | Performed by: NURSE PRACTITIONER

## 2019-10-15 RX ORDER — CYCLOBENZAPRINE HCL 10 MG
10 TABLET ORAL NIGHTLY PRN
Qty: 30 TABLET | Refills: 3 | Status: SHIPPED | OUTPATIENT
Start: 2019-10-15 | End: 2021-05-11

## 2019-10-15 NOTE — PROGRESS NOTES
Ezekiel Lama is a 27year old female. Patient presents with:  Menstrual Problem: Irregular periods      HPI:   Complaints of having menses more often- had it 9/23- 28th then stopped - then got it again Oct 4- 9 then started again on Oct 12 .    Thinks it breath with exertion, no cough  CARDIOVASCULAR: denies complaints   GYNE: See HPI  GI: See HPI  MUSCULOSKELETAL: HPI  NEURO: denies headaches, denies dizziness  PSYCH:denies complaints     EXAM:   /74 (BP Location: Left arm, Patient Position: Sitting below 140. Be cautious not to over heat,  you  want to keep a normal core body temperature. If you develop a fever you may only use Tylenol over the counter for now, you may ask for a list of  pregnant safe medications from OB.  You could also get your tem

## 2019-10-15 NOTE — PATIENT INSTRUCTIONS
Check  fasting blood work - may check on cost here and at [employer] supplements -      It is important to get enough sleep (at least 7 hrs a night).   Increase EXERCISE, eat a healthy diet (5 fruits and/or vegetables a day), stay hydrated,  take a mult

## 2019-10-29 NOTE — PROGRESS NOTES
[address]  PROGRESS NOTE  Chief Complaint:   Patient presents with: Follow - Up: Flexeril Interfere with Citalopram?      HPI:   This is a 27year old female coming in for follow-up on her medication.   She has been taking her citalopra Take 1 tablet (10 mg total) by mouth daily. , Disp: 90 tablet, Rfl: 1  Multiple Vitamins-Minerals (MULTIVITAMIN GUMMIES WOMENS) Oral Chew Tab, Chew 1 tablet by mouth daily.   , Disp: , Rfl:        Counseling given: Not Answered       REVIEW OF SYSTEMS:   CON lesions or ulcerations, good dentition. NECK: Supple, no CLAD, no JVD, no thyromegaly. SKIN: No rashes, no skin lesion, no bruising, good turgor. HEART:  Regular rate and rhythm, no murmurs, rubs or gallops.   LUNGS: Clear to auscultation bilterally, no

## 2019-12-10 RX ORDER — CITALOPRAM 10 MG/1
TABLET ORAL
Qty: 90 TABLET | Refills: 1 | Status: SHIPPED | OUTPATIENT
Start: 2019-12-10 | End: 2020-06-05

## 2019-12-10 NOTE — TELEPHONE ENCOUNTER
Future appt:     Your appointments     Date & Time Appointment Department [facility])    Apr 28, 2020  3:30 PM CDT Follow up - Extended with Too Leung MD [facility]            Lebron Roman

## 2020-03-30 ENCOUNTER — PATIENT MESSAGE (OUTPATIENT)
Dept: FAMILY MEDICINE CLINIC | Facility: CLINIC | Age: 35
End: 2020-03-30

## 2020-06-04 NOTE — TELEPHONE ENCOUNTER
Patient is overdue for an appt. Please advise which would be appropriate - in office or video? Future appt:     Last Appointment with provider:  10/29/2019; Return in about 6 months (around 4/29/2020).      Last appointment at [facility]:  Visit date

## 2020-06-05 RX ORDER — CITALOPRAM 10 MG/1
TABLET ORAL
Qty: 90 TABLET | Refills: 1 | Status: SHIPPED | OUTPATIENT
Start: 2020-06-05 | End: 2020-12-15

## 2020-06-05 NOTE — TELEPHONE ENCOUNTER
A video visit would be very appropriate for follow-up visit for [address] [place] [address]. Please ask her to schedule a video visit.

## 2020-12-15 RX ORDER — CITALOPRAM 10 MG/1
TABLET ORAL
Qty: 90 TABLET | Refills: 0 | Status: SHIPPED | OUTPATIENT
Start: 2020-12-15 | End: 2021-03-12

## 2020-12-15 NOTE — TELEPHONE ENCOUNTER
Citalopram: 6/5/20    Future appt:    Last Appointment with provider:    10/29/2020    Last appointment at [facility]:  Visit date not found  Cholesterol, Total (mg/dL)   Date Value   08/27/2018 178     HDL Cholesterol (mg/dL)   Date Value   08/27/2018 5

## 2021-03-12 RX ORDER — CITALOPRAM 10 MG/1
TABLET ORAL
Qty: 90 TABLET | Refills: 0 | Status: SHIPPED | OUTPATIENT
Start: 2021-03-12 | End: 2021-06-04

## 2021-03-12 RX ORDER — CITALOPRAM 10 MG/1
10 TABLET ORAL DAILY
Qty: 90 TABLET | Refills: 0 | OUTPATIENT
Start: 2021-03-12

## 2021-03-12 NOTE — TELEPHONE ENCOUNTER
Future appt:    Last Appointment with provider:  10/29/2019    [place] appointment [place] [facility]:  Visit date not found  Cholesterol, Total (mg/dL)   Date Value   08/27/2018 178     HDL Cholesterol (mg/dL)   Date Value   08/27/2018 51     LDL Cholesterol (m

## 2021-05-11 ENCOUNTER — OFFICE VISIT (OUTPATIENT)
Dept: FAMILY MEDICINE CLINIC | Facility: CLINIC | Age: 36
End: 2021-05-11
Payer: COMMERCIAL

## 2021-05-11 ENCOUNTER — LAB ENCOUNTER (OUTPATIENT)
Dept: LAB | Age: 36
End: 2021-05-11
Attending: FAMILY MEDICINE
Payer: COMMERCIAL

## 2021-05-11 VITALS
OXYGEN SATURATION: 99 % | SYSTOLIC BLOOD PRESSURE: 114 MMHG | DIASTOLIC BLOOD PRESSURE: 60 MMHG | RESPIRATION RATE: 16 BRPM | WEIGHT: 148 LBS | BODY MASS INDEX: 25.9 KG/M2 | TEMPERATURE: 98 F | HEIGHT: 63.5 IN | HEART RATE: 80 BPM

## 2021-05-11 DIAGNOSIS — R53.82 CHRONIC FATIGUE: Primary | ICD-10-CM

## 2021-05-11 DIAGNOSIS — F33.1 MODERATE EPISODE OF RECURRENT MAJOR DEPRESSIVE DISORDER (HCC): ICD-10-CM

## 2021-05-11 DIAGNOSIS — R53.82 CHRONIC FATIGUE: ICD-10-CM

## 2021-05-11 PROBLEM — R10.30 LOWER ABDOMINAL PAIN: Status: RESOLVED | Noted: 2018-10-22 | Resolved: 2021-05-11

## 2021-05-11 PROCEDURE — 3078F DIAST BP <80 MM HG: CPT | Performed by: FAMILY MEDICINE

## 2021-05-11 PROCEDURE — 83540 ASSAY OF IRON: CPT | Performed by: FAMILY MEDICINE

## 2021-05-11 PROCEDURE — 99213 OFFICE O/P EST LOW 20 MIN: CPT | Performed by: FAMILY MEDICINE

## 2021-05-11 PROCEDURE — 85025 COMPLETE CBC W/AUTO DIFF WBC: CPT | Performed by: FAMILY MEDICINE

## 2021-05-11 PROCEDURE — 83550 IRON BINDING TEST: CPT | Performed by: FAMILY MEDICINE

## 2021-05-11 PROCEDURE — 82728 ASSAY OF FERRITIN: CPT | Performed by: FAMILY MEDICINE

## 2021-05-11 PROCEDURE — 80053 COMPREHEN METABOLIC PANEL: CPT | Performed by: FAMILY MEDICINE

## 2021-05-11 PROCEDURE — 3074F SYST BP LT 130 MM HG: CPT | Performed by: FAMILY MEDICINE

## 2021-05-11 PROCEDURE — 3008F BODY MASS INDEX DOCD: CPT | Performed by: FAMILY MEDICINE

## 2021-05-11 NOTE — PROGRESS NOTES
[place] [facility] [place]  PROGRESS NOTE  Chief Complaint:   Patient presents with:  Fatigue: exhaustion, tired/sleepy  Medication Follow-Up  Pregnancy Issues: miscarriage      HPI:   This is a 38year old female coming in for several issues.     First, Chew Tab Chew 1 tablet by mouth daily. Counseling given: Not Answered       REVIEW OF SYSTEMS:   CONSTITUTIONAL: She has a great deal of fatigue every afternoon.   EENT:  Eyes:  Denies eye pain, visual loss, blurred vision, double vision or yellow no eye discharge Ears: External normal. Nose: patent, no nasal discharge Throat:  No tonsillar erythema or exudate. Mouth:  No oral lesions or ulcerations, good dentition. NECK: Supple, no CLAD, no JVD, no thyromegaly.   SKIN: No rashes, no skin lesion, n

## 2021-05-12 ENCOUNTER — TELEPHONE (OUTPATIENT)
Dept: FAMILY MEDICINE CLINIC | Facility: CLINIC | Age: 36
End: 2021-05-12

## 2021-05-12 NOTE — TELEPHONE ENCOUNTER
M/L that [employer] Message was sent regarding lab results. Asked to call back if any questions regarding lab results.   John Peak, 05/12/21, 2:45 PM

## 2021-05-12 NOTE — TELEPHONE ENCOUNTER
----- Message from Sara Evans MD sent at 5/12/2021  9:47 AM CDT -----  Lab results look very good. Kidney function is normal.  Blood sugar is slightly elevated at 107 but this was not a fasting sample. Iron level is normal at 71.   Ferritin level i

## 2021-05-19 ENCOUNTER — PATIENT MESSAGE (OUTPATIENT)
Dept: FAMILY MEDICINE CLINIC | Facility: CLINIC | Age: 36
End: 2021-05-19

## 2021-05-19 NOTE — TELEPHONE ENCOUNTER
From: Niraj Kal  To: Crescencio Rosa MD  Sent: 5/19/2021 5:30 PM CDT  Subject: Non-Urgent Medical Question    Hi Dr. Jessa Jhaveri,    I didn't get the vaccine while pregnant bc I felt really concerned w how it could effect things.      Can you share with

## 2021-05-19 NOTE — TELEPHONE ENCOUNTER
When the Covid vaccine first came out, there was a lot of uncertainty about pregnancy just because we did not know. There now is much better data that shows that it does not seem to cause significant problems or concerns during pregnancy.   There also were

## 2021-06-04 RX ORDER — CITALOPRAM 10 MG/1
TABLET ORAL
Qty: 90 TABLET | Refills: 1 | Status: SHIPPED | OUTPATIENT
Start: 2021-06-04 | End: 2021-11-29

## 2021-06-04 NOTE — TELEPHONE ENCOUNTER
Future appt:    Last Appointment with provider:   5/11/2021  Last appointment [place] [facility]:  5/11/2021  Cholesterol, Total (mg/dL)   Date Value   08/27/2018 178     HDL Cholesterol (mg/dL)   Date Value   08/27/2018 51     LDL Cholesterol (mg/dL)   Date

## 2021-11-01 ENCOUNTER — TELEPHONE (OUTPATIENT)
Dept: FAMILY MEDICINE CLINIC | Facility: CLINIC | Age: 36
End: 2021-11-01

## 2021-11-01 NOTE — TELEPHONE ENCOUNTER
Patient states she has been having pain  Left side of neck/head for the past 3-4 days. States pain is shooting and not constant. Had something similar in the past.    Using aleve with some relief. Would like evaluation.     Patient states it can be nex

## 2021-11-10 ENCOUNTER — OFFICE VISIT (OUTPATIENT)
Dept: FAMILY MEDICINE CLINIC | Facility: CLINIC | Age: 36
End: 2021-11-10
Payer: COMMERCIAL

## 2021-11-10 VITALS
RESPIRATION RATE: 16 BRPM | DIASTOLIC BLOOD PRESSURE: 80 MMHG | HEART RATE: 81 BPM | TEMPERATURE: 98 F | BODY MASS INDEX: 27.82 KG/M2 | SYSTOLIC BLOOD PRESSURE: 112 MMHG | WEIGHT: 159 LBS | OXYGEN SATURATION: 99 % | HEIGHT: 63.5 IN

## 2021-11-10 DIAGNOSIS — M26.622 ARTHRALGIA OF LEFT TEMPOROMANDIBULAR JOINT: ICD-10-CM

## 2021-11-10 DIAGNOSIS — B02.9 HERPES ZOSTER WITHOUT COMPLICATION: Primary | ICD-10-CM

## 2021-11-10 PROCEDURE — 99214 OFFICE O/P EST MOD 30 MIN: CPT | Performed by: NURSE PRACTITIONER

## 2021-11-10 PROCEDURE — 3079F DIAST BP 80-89 MM HG: CPT | Performed by: NURSE PRACTITIONER

## 2021-11-10 PROCEDURE — 3074F SYST BP LT 130 MM HG: CPT | Performed by: NURSE PRACTITIONER

## 2021-11-10 PROCEDURE — 3008F BODY MASS INDEX DOCD: CPT | Performed by: NURSE PRACTITIONER

## 2021-11-10 RX ORDER — CYCLOBENZAPRINE HCL 5 MG
5 TABLET ORAL NIGHTLY
Qty: 30 TABLET | Refills: 1 | Status: SHIPPED | OUTPATIENT
Start: 2021-11-10

## 2021-11-10 NOTE — PROGRESS NOTES
Connie Maher is a 39year old female.   Patient presents with:  Neck Pain      HPI:   Complaints of pain to left side of neck - had shooting neck pain 2 weeks ago- was better the next day - had a massage- made it worse -   Ice and heat help - tried [employer] cough  CARDIOVASCULAR: denies complaints   GI: denies abdominal pain, nausea, vomiting, diarrhea   MUSCULOSKELETAL:  No arthralgias or myalgias  NEURO: See HPI  PSYCH:denies complaints     EXAM:   /80   Pulse 81   Temp 98.2 °F (36.8 °C)   Resp 16   H post herpetic neuralgia. If this occurs, then follow up for further evaluation. May use capsicin cream if needed.      Take Flexeril at bedtime as needed for clenching of your jaw–if 1 does not help any to feel groggy you can try two    Spent a total of

## 2021-11-10 NOTE — PATIENT INSTRUCTIONS
Shingles is Varicella Zoster and that the first time a person is exposed to the virus they get chicken pox.   The virus then stays dormant in your body until a time when your immune system is lowered either by physical or emotional stress, the shingles occu

## 2021-11-13 ENCOUNTER — TELEPHONE (OUTPATIENT)
Dept: FAMILY MEDICINE CLINIC | Facility: CLINIC | Age: 36
End: 2021-11-13

## 2021-11-13 DIAGNOSIS — U07.1 COVID: Primary | ICD-10-CM

## 2021-11-13 NOTE — TELEPHONE ENCOUNTER
I spoke with Silvina Valdez, her . He reports Janak Cope tested positive for Covid November 11, 2021 at his [facility] in [place]. Her symptoms are becoming increasingly more severe. She is having a sore throat and fevers.   They had talked to telehealth physician

## 2021-11-13 NOTE — TELEPHONE ENCOUNTER
I spoke with Catalino Bhardwaj, her . He reports Ethel Benson tested positive for Covid November 11, 2021 at his [facility] in [place]. Her symptoms are becoming increasingly more severe. She is having a sore throat and fevers.   They had talked to telehealth physician

## 2021-11-15 ENCOUNTER — TELEPHONE (OUTPATIENT)
Dept: FAMILY MEDICINE CLINIC | Facility: CLINIC | Age: 36
End: 2021-11-15

## 2021-11-15 ENCOUNTER — NURSE ONLY (OUTPATIENT)
Dept: INFUSION CENTER | Age: 36
End: 2021-11-15
Attending: FAMILY MEDICINE
Payer: COMMERCIAL

## 2021-11-15 VITALS
BODY MASS INDEX: 28 KG/M2 | WEIGHT: 159 LBS | TEMPERATURE: 98 F | DIASTOLIC BLOOD PRESSURE: 72 MMHG | SYSTOLIC BLOOD PRESSURE: 100 MMHG | OXYGEN SATURATION: 100 % | RESPIRATION RATE: 16 BRPM | HEART RATE: 71 BPM

## 2021-11-15 NOTE — PROGRESS NOTES
1103-Patient arrived for infusion. Procedure gone over with patient. 1108-22G angiocath inserted into patients left anticubital.  Placement checked. Patient tolerated well. 1124-Infusion started. Patient sitting looking at phone.   1142-Infusion comple

## 2021-11-15 NOTE — TELEPHONE ENCOUNTER
Pt received PAB infusion at [facility] on 11/15 for COVID-19. Please follow-up with pt for post-infusion assessment and home monitoring if needed. Thank you.

## 2021-11-16 NOTE — TELEPHONE ENCOUNTER
Please note [place]. Patient doing well.   Angella Dykes, [facility], 11/16/21, 4:33 PM

## 2021-11-29 RX ORDER — CITALOPRAM 10 MG/1
TABLET ORAL
Qty: 90 TABLET | Refills: 1 | Status: SHIPPED | OUTPATIENT
Start: 2021-11-29

## 2021-11-29 NOTE — TELEPHONE ENCOUNTER
Future appt:    Last Appointment with provider:  5/11/2021; No f/u recommended    Last appointment at [facility]:  11/10/2021  Cholesterol, Total (mg/dL)   Date Value   08/27/2018 178     HDL Cholesterol (mg/dL)   Date Value   08/27/2018 51     LDL Sara

## 2022-05-26 RX ORDER — CITALOPRAM 10 MG/1
10 TABLET ORAL DAILY
Qty: 90 TABLET | Refills: 0 | Status: SHIPPED | OUTPATIENT
Start: 2022-05-26

## 2022-05-26 NOTE — TELEPHONE ENCOUNTER
Future appt:    Last Appointment with provider:   Visit date not found  Last appointment at [facility]:  11/10/2021 - EL - herpes zoster       Last px - 8/27/2018 - [facility]cms advising needs px to schedule    Cholesterol, Total (mg/dL)   Date Value   08/27/2018 178     HDL Cholesterol (mg/dL)   Date Value   08/27/2018 51     LDL Cholesterol (mg/dL)   Date Value   08/27/2018 116 (H)     Triglycerides (mg/dL)   Date Value   08/27/2018 55     No results found for: EAG, A1C  No results found for: T4F, TSH, TSHT4    No follow-ups on file.

## 2023-08-17 ENCOUNTER — LAB ENCOUNTER (OUTPATIENT)
Dept: LAB | Age: 38
End: 2023-08-17
Attending: NURSE PRACTITIONER
Payer: COMMERCIAL

## 2023-08-17 ENCOUNTER — OFFICE VISIT (OUTPATIENT)
Dept: FAMILY MEDICINE CLINIC | Facility: CLINIC | Age: 38
End: 2023-08-17
Payer: COMMERCIAL

## 2023-08-17 VITALS
WEIGHT: 161.38 LBS | OXYGEN SATURATION: 97 % | DIASTOLIC BLOOD PRESSURE: 64 MMHG | HEART RATE: 71 BPM | TEMPERATURE: 98 F | HEIGHT: 63.5 IN | SYSTOLIC BLOOD PRESSURE: 102 MMHG | RESPIRATION RATE: 16 BRPM | BODY MASS INDEX: 28.24 KG/M2

## 2023-08-17 DIAGNOSIS — R51.9 INCREASED FREQUENCY OF HEADACHES: ICD-10-CM

## 2023-08-17 DIAGNOSIS — K92.1 BLOOD IN STOOL: Primary | ICD-10-CM

## 2023-08-17 DIAGNOSIS — K92.1 BLOOD IN STOOL: ICD-10-CM

## 2023-08-17 LAB
BASOPHILS # BLD AUTO: 0.04 X10(3) UL (ref 0–0.2)
BASOPHILS NFR BLD AUTO: 0.5 %
EOSINOPHIL # BLD AUTO: 0.29 X10(3) UL (ref 0–0.7)
EOSINOPHIL NFR BLD AUTO: 3.8 %
ERYTHROCYTE [DISTWIDTH] IN BLOOD BY AUTOMATED COUNT: 11.8 %
HCT VFR BLD AUTO: 39.5 %
HGB BLD-MCNC: 12.8 G/DL
IMM GRANULOCYTES # BLD AUTO: 0.03 X10(3) UL (ref 0–1)
IMM GRANULOCYTES NFR BLD: 0.4 %
LYMPHOCYTES # BLD AUTO: 2.04 X10(3) UL (ref 1–4)
LYMPHOCYTES NFR BLD AUTO: 26.7 %
MCH RBC QN AUTO: 29 PG (ref 26–34)
MCHC RBC AUTO-ENTMCNC: 32.4 G/DL (ref 31–37)
MCV RBC AUTO: 89.6 FL
MONOCYTES # BLD AUTO: 0.59 X10(3) UL (ref 0.1–1)
MONOCYTES NFR BLD AUTO: 7.7 %
NEUTROPHILS # BLD AUTO: 4.66 X10 (3) UL (ref 1.5–7.7)
NEUTROPHILS # BLD AUTO: 4.66 X10(3) UL (ref 1.5–7.7)
NEUTROPHILS NFR BLD AUTO: 60.9 %
OCCULT BLOOD, STOOL 1: YES
PERFORMANCE MONITORS CORRECT (YES/NO): NO YES/NO
PLATELET # BLD AUTO: 202 10(3)UL (ref 150–450)
RBC # BLD AUTO: 4.41 X10(6)UL
WBC # BLD AUTO: 7.7 X10(3) UL (ref 4–11)

## 2023-08-17 PROCEDURE — 99213 OFFICE O/P EST LOW 20 MIN: CPT | Performed by: NURSE PRACTITIONER

## 2023-08-17 PROCEDURE — 82272 OCCULT BLD FECES 1-3 TESTS: CPT | Performed by: NURSE PRACTITIONER

## 2023-08-17 PROCEDURE — 3008F BODY MASS INDEX DOCD: CPT | Performed by: NURSE PRACTITIONER

## 2023-08-17 PROCEDURE — 3078F DIAST BP <80 MM HG: CPT | Performed by: NURSE PRACTITIONER

## 2023-08-17 PROCEDURE — 3074F SYST BP LT 130 MM HG: CPT | Performed by: NURSE PRACTITIONER

## 2023-08-17 PROCEDURE — 85025 COMPLETE CBC W/AUTO DIFF WBC: CPT | Performed by: NURSE PRACTITIONER

## 2023-08-17 RX ORDER — DROSPIRENONE 4 MG/1
4 TABLET, FILM COATED ORAL DAILY
COMMUNITY
Start: 2023-08-06

## 2023-08-17 RX ORDER — ENOXAPARIN SODIUM 100 MG/ML
40 INJECTION SUBCUTANEOUS DAILY
COMMUNITY
Start: 2022-10-21 | End: 2023-08-17 | Stop reason: ALTCHOICE

## 2023-08-17 RX ORDER — CITALOPRAM 20 MG/1
20 TABLET ORAL DAILY
COMMUNITY

## 2023-08-17 RX ORDER — FLUCONAZOLE 200 MG/1
200 TABLET ORAL DAILY
COMMUNITY
Start: 2023-03-30 | End: 2023-08-17 | Stop reason: ALTCHOICE

## 2023-08-17 RX ORDER — ACETAMINOPHEN 500 MG
1000 TABLET ORAL EVERY 6 HOURS
COMMUNITY
Start: 2023-01-10 | End: 2023-08-17 | Stop reason: ALTCHOICE

## 2023-08-17 RX ORDER — ASPIRIN 81 MG/1
81 TABLET ORAL DAILY
COMMUNITY
End: 2023-08-17 | Stop reason: ALTCHOICE

## 2023-08-17 RX ORDER — FERROUS SULFATE 325(65) MG
325 TABLET ORAL
COMMUNITY
Start: 2023-01-10 | End: 2023-08-17 | Stop reason: ALTCHOICE

## 2023-08-17 RX ORDER — LABETALOL 200 MG/1
200 TABLET, FILM COATED ORAL 2 TIMES DAILY PRN
COMMUNITY
Start: 2023-02-23 | End: 2023-08-17 | Stop reason: ALTCHOICE

## 2023-08-18 ENCOUNTER — PATIENT MESSAGE (OUTPATIENT)
Dept: FAMILY MEDICINE CLINIC | Facility: CLINIC | Age: 38
End: 2023-08-18

## 2023-08-18 NOTE — TELEPHONE ENCOUNTER
From: Yoon Villanueva  To: HILDA Cox  Sent: 8/18/2023 9:29 AM CDT  Subject: Question regarding CBC W/ DIFFERENTIAL    Ok thank you, what was the name of the GI Dr. Anyi Church referred me to?  Thank you
